# Patient Record
Sex: MALE | Race: WHITE | NOT HISPANIC OR LATINO
[De-identification: names, ages, dates, MRNs, and addresses within clinical notes are randomized per-mention and may not be internally consistent; named-entity substitution may affect disease eponyms.]

---

## 2017-01-18 ENCOUNTER — TRANSCRIPTION ENCOUNTER (OUTPATIENT)
Age: 27
End: 2017-01-18

## 2017-04-13 ENCOUNTER — EMERGENCY (EMERGENCY)
Facility: HOSPITAL | Age: 27
LOS: 1 days | Discharge: HOME | End: 2017-04-13
Attending: EMERGENCY MEDICINE | Admitting: EMERGENCY MEDICINE
Payer: COMMERCIAL

## 2017-04-13 VITALS
OXYGEN SATURATION: 96 % | RESPIRATION RATE: 18 BRPM | SYSTOLIC BLOOD PRESSURE: 160 MMHG | HEIGHT: 74 IN | DIASTOLIC BLOOD PRESSURE: 94 MMHG | TEMPERATURE: 96 F | HEART RATE: 135 BPM | WEIGHT: 181 LBS

## 2017-04-13 VITALS
HEART RATE: 88 BPM | DIASTOLIC BLOOD PRESSURE: 76 MMHG | OXYGEN SATURATION: 97 % | TEMPERATURE: 98 F | SYSTOLIC BLOOD PRESSURE: 142 MMHG | RESPIRATION RATE: 16 BRPM

## 2017-04-13 DIAGNOSIS — F12.920 CANNABIS USE, UNSPECIFIED WITH INTOXICATION, UNCOMPLICATED: ICD-10-CM

## 2017-04-13 DIAGNOSIS — T40.601D: ICD-10-CM

## 2017-04-13 DIAGNOSIS — F19.20 OTHER PSYCHOACTIVE SUBSTANCE DEPENDENCE, UNCOMPLICATED: ICD-10-CM

## 2017-04-13 DIAGNOSIS — T40.1X1A POISONING BY HEROIN, ACCIDENTAL (UNINTENTIONAL), INITIAL ENCOUNTER: ICD-10-CM
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
## 2017-04-13 DIAGNOSIS — R69 ILLNESS, UNSPECIFIED: ICD-10-CM

## 2017-04-13 LAB
AMPHET UR-MCNC: NEGATIVE — SIGNIFICANT CHANGE UP
ANION GAP SERPL CALC-SCNC: 11 MMOL/L — SIGNIFICANT CHANGE UP (ref 5–17)
APAP SERPL-MCNC: <2 UG/ML — LOW (ref 10–30)
BARBITURATES UR SCN-MCNC: NEGATIVE — SIGNIFICANT CHANGE UP
BASOPHILS # BLD AUTO: 0.1 K/UL — SIGNIFICANT CHANGE UP (ref 0–0.2)
BASOPHILS NFR BLD AUTO: 0.6 % — SIGNIFICANT CHANGE UP (ref 0–2)
BENZODIAZ UR-MCNC: POSITIVE — SIGNIFICANT CHANGE UP
BUN SERPL-MCNC: 17 MG/DL — SIGNIFICANT CHANGE UP (ref 7–23)
CALCIUM SERPL-MCNC: 9.4 MG/DL — SIGNIFICANT CHANGE UP (ref 8.5–10.1)
CHLORIDE SERPL-SCNC: 104 MMOL/L — SIGNIFICANT CHANGE UP (ref 96–108)
CO2 SERPL-SCNC: 25 MMOL/L — SIGNIFICANT CHANGE UP (ref 22–31)
COCAINE METAB.OTHER UR-MCNC: NEGATIVE — SIGNIFICANT CHANGE UP
CREAT SERPL-MCNC: 1.8 MG/DL — HIGH (ref 0.5–1.3)
EOSINOPHIL # BLD AUTO: 0.1 K/UL — SIGNIFICANT CHANGE UP (ref 0–0.5)
EOSINOPHIL NFR BLD AUTO: 0.7 % — SIGNIFICANT CHANGE UP (ref 0–6)
ETHANOL SERPL-MCNC: <10 MG/DL — SIGNIFICANT CHANGE UP (ref 0–10)
GLUCOSE SERPL-MCNC: 382 MG/DL — HIGH (ref 70–99)
HCT VFR BLD CALC: 55 % — HIGH (ref 39–50)
HGB BLD-MCNC: 17.5 G/DL — HIGH (ref 13–17)
LYMPHOCYTES # BLD AUTO: 13.1 % — SIGNIFICANT CHANGE UP (ref 13–44)
LYMPHOCYTES # BLD AUTO: 2.3 K/UL — SIGNIFICANT CHANGE UP (ref 1–3.3)
MCHC RBC-ENTMCNC: 31.4 PG — SIGNIFICANT CHANGE UP (ref 27–34)
MCHC RBC-ENTMCNC: 31.8 GM/DL — LOW (ref 32–36)
MCV RBC AUTO: 98.7 FL — SIGNIFICANT CHANGE UP (ref 80–100)
METHADONE UR-MCNC: NEGATIVE — SIGNIFICANT CHANGE UP
MONOCYTES # BLD AUTO: 0.4 K/UL — SIGNIFICANT CHANGE UP (ref 0–0.9)
MONOCYTES NFR BLD AUTO: 2.6 % — SIGNIFICANT CHANGE UP (ref 1–9)
NEUTROPHILS # BLD AUTO: 14.6 K/UL — HIGH (ref 1.8–7.4)
NEUTROPHILS NFR BLD AUTO: 83 % — HIGH (ref 43–77)
OPIATES UR-MCNC: POSITIVE — SIGNIFICANT CHANGE UP
PCP SPEC-MCNC: SIGNIFICANT CHANGE UP
PCP UR-MCNC: NEGATIVE — SIGNIFICANT CHANGE UP
PLATELET # BLD AUTO: 325 K/UL — SIGNIFICANT CHANGE UP (ref 150–400)
POTASSIUM SERPL-MCNC: 5.7 MMOL/L — HIGH (ref 3.5–5.3)
POTASSIUM SERPL-SCNC: 5.7 MMOL/L — HIGH (ref 3.5–5.3)
RBC # BLD: 5.57 M/UL — SIGNIFICANT CHANGE UP (ref 4.2–5.8)
RBC # FLD: 13.3 % — SIGNIFICANT CHANGE UP (ref 10.3–14.5)
SALICYLATES SERPL-MCNC: <1.7 MG/DL — LOW (ref 2.8–20)
SODIUM SERPL-SCNC: 140 MMOL/L — SIGNIFICANT CHANGE UP (ref 135–145)
THC UR QL: POSITIVE — SIGNIFICANT CHANGE UP
WBC # BLD: 17.6 K/UL — HIGH (ref 3.8–10.5)
WBC # FLD AUTO: 17.6 K/UL — HIGH (ref 3.8–10.5)

## 2017-04-13 PROCEDURE — 96360 HYDRATION IV INFUSION INIT: CPT | Mod: 59

## 2017-04-13 PROCEDURE — 99284 EMERGENCY DEPT VISIT MOD MDM: CPT | Mod: 25

## 2017-04-13 PROCEDURE — 92950 HEART/LUNG RESUSCITATION CPR: CPT

## 2017-04-13 PROCEDURE — 80048 BASIC METABOLIC PNL TOTAL CA: CPT

## 2017-04-13 PROCEDURE — 99285 EMERGENCY DEPT VISIT HI MDM: CPT | Mod: 25

## 2017-04-13 PROCEDURE — 80307 DRUG TEST PRSMV CHEM ANLYZR: CPT

## 2017-04-13 PROCEDURE — 85027 COMPLETE CBC AUTOMATED: CPT

## 2017-04-13 RX ORDER — ALPRAZOLAM 0.25 MG
2 TABLET ORAL ONCE
Qty: 0 | Refills: 0 | Status: DISCONTINUED | OUTPATIENT
Start: 2017-04-13 | End: 2017-04-13

## 2017-04-13 RX ORDER — SODIUM CHLORIDE 9 MG/ML
1000 INJECTION INTRAMUSCULAR; INTRAVENOUS; SUBCUTANEOUS ONCE
Qty: 0 | Refills: 0 | Status: COMPLETED | OUTPATIENT
Start: 2017-04-13 | End: 2017-04-13

## 2017-04-13 RX ADMIN — Medication 2 MILLIGRAM(S): at 10:45

## 2017-04-13 RX ADMIN — SODIUM CHLORIDE 2000 MILLILITER(S): 9 INJECTION INTRAMUSCULAR; INTRAVENOUS; SUBCUTANEOUS at 06:45

## 2017-04-13 NOTE — ED ADULT NURSE NOTE - OBJECTIVE STATEMENT
as per EMS.. pt found on floor by mother. ems arrived began ACLS for cardiac arrest. narcan 3 mg intra nasal given and cpr done x 10 minutes. pt awoke, in sinus tach.. pt confused, asking repetitive questions. admits to snorting heroin and also uses xanax.

## 2017-04-13 NOTE — ED BEHAVIORAL HEALTH ASSESSMENT NOTE - PRIMARY DX
Opiate or related narcotic overdose, accidental or unintentional, subsequent encounter Axis II diagnosis deferred

## 2017-04-13 NOTE — ED BEHAVIORAL HEALTH ASSESSMENT NOTE - DETAILS
Remote seizure after running out of Xanax prescription Maternal bipolar Patient was initially altered s/p opiate overdose however was alert and oriented X 4 during psychiatric evaluation Patient had initial compromised respiratory function s/p opiate overdose however improved with treatment in ED Mother notified.

## 2017-04-13 NOTE — ED PROVIDER NOTE - OBJECTIVE STATEMENT
28yo male bib ems s/p drug overdose. pt was found unresponsive by mom, ems was called, pt was found to be pulseless and cpr was initiated, pt given narcan 3mg intranasal and after 10 minutes pt woke up, upon arrival to ER pt is denying any drug use at this time, and is still lethargic from overdosing so no further hx is able to be obtained

## 2017-04-13 NOTE — ED BEHAVIORAL HEALTH ASSESSMENT NOTE - HPI (INCLUDE ILLNESS QUALITY, SEVERITY, DURATION, TIMING, CONTEXT, MODIFYING FACTORS, ASSOCIATED SIGNS AND SYMPTOMS)
Mr. Mcclelland is a 27-year-old single  male, domiciled with family in [place], non-caregiver, with PPDx Anxiety, no prior hospitalizations, with chronic substance use (opiates) and substance-related treatment (Suboxone program 5 years ago), with prior withdrawals and 2 seizures secondary to running out of Xanax prescription, with no prior DT’s / complications, with no prior trauma/abuse, with family history of maternal bipolar, with no relevant PMHx, was referred to [facility] by [place] Attending secondary to mother reporting patient is suicidal in the context of heroin overdose.    Patient is reported to have been found unresponsive by mother, and when EMS arrived, patient was pulseless and CPR was initiated, with Narcan 3 mg being administered intranasally after patient awakened after 10 minutes.     Upon arrival to ED, patient denied substance use at the time, however urine toxicology results reveals opiates, THC, benzodiazepines. Patient was later forthcoming about his substance use during evaluation, stating to have relapsed today after 2 years of being sober, adding to have used 2 bags of heroin and stating the overdose was accidental / unintentional. Reports remorse / regret, stating "it was stupid." Reports last overdose was the same scenario, using after being sober for a long time, and ended up in hospital. Denies having suicidal ideation before -reusing heroin, stating "I was bored and had a little cash on me." Denies prior / current suicidal ideation/intent/plan. Denies prior passive suicidality. When asked about his mother's concern about his suicidality, patient reports mother being an "unreliable source of information; she has bipolar and does not like me." Denies prior / current depressive symptoms of persistent sad mood, hopelessness, helplessness, worthlessness, anhedonia, guilt feelings, difficulty concentrating, fatigue, decreased appetite, low motivation and difficulty falling asleep. Reports to be socially isolative because his friends are substance abusers. Reports anxiety being stable and well managed by outpatient psychiatrist (for 6 years), with whom he has appointment with. Denies manic / psychotic symptoms. Denies acute changes in mood / function, adding to go to work and care for self. Denies stressors. Reports enjoying keeping to self and watching TV. Reports having strained relationship with family as they favor his sister and want to kick him out the house, however stating to have positive therapeutic relationships with them, and social supports at work. Reports future orientation, with motivation to continue outpatient treatment. Reports wanting to go home, "shower and smoke a cigarette."     Collateral obtained by [facility], please see  note for full collateral note, however in short: mother reports chronic history of substance use, and prior overdoses, of which last one was 2 years ago, accidental and unintentional Hence. During relapse. Reports patient recently relapsed. Reports patient has worsening depression / anxiety, and is isolative. Reports patient has expressed suicidal ideation when angry (patient denies), denying recent or prior suicide attempt / self-injurious behaviors.  Denies prior hospitalization. Reports being treated by out-patient psychiatrist.

## 2017-04-13 NOTE — ED BEHAVIORAL HEALTH ASSESSMENT NOTE - CASE SUMMARY
27-year-old single  male, domiciled with family in [place], non-caregiver, with PPDx Anxiety, no prior hospitalizations, with chronic substance use (opiates) and substance-related treatment (Suboxone program 5 years ago), with prior withdrawals and 2 seizures secondary to running out of Xanax prescription, with no prior DT’s / complications, with no prior trauma/abuse, with family history of maternal bipolar, with no relevant PMHx, was referred to [facility] by [place] Attending secondary to mother reporting patient is suicidal in the context of heroin overdose.   on evaluation pt is not suicidal or an acute danger. he does not require inpatient admission. the primary diagnosis is opioid dependence (relapse). he will be offered chem dependency tx and can f/u with his outpatient provider.

## 2017-04-13 NOTE — ED BEHAVIORAL HEALTH NOTE - BEHAVIORAL HEALTH NOTE
Telepsychiatry Encounter  I have visualized that the patient is on an arms-length 1:1.  I have visualized that the patient is in a private space.  I have confirmed with the patient that they understanding and agree to the evaluation being performed via Telepsychiatry.  I have discussed the above with Telepsychiatry Attending :  Jeanne uMnguia NP  Records reviewed: [place], Tier, CVM, [facility], Psyckes, Alpha:    No results found    Collateral contact name/phone:    Cherie Mcclelland , Mother, [phone]  Main psych symptoms/what led to ED visit/ presentation in ED:   Per ED assessment Pt was brought to ED after mother found Pt unresponsive as a result of an OD.  Per ED staff Pt is calm but “manipulative” with staff.  Per mother, daughter found Pt laying on his bed unconscious and 911 was called. Mother reports Pt was not breathing and was revived by EMS with Narcan. Mother reports sister found Pt unconscious on his bed and called EMS. Mother reports Pt did not verbalize SI but mother feels “he clearly doesn’t care whether he lives or dies.”    Main Psych diagnosis/ history pt of hospitalizations/recent admission/ HIP:   Mother reports she is unsure of Pt’s psychiatric diagnosis but believes he “must have one” as pt is seen by outpatient, Dr. Radames Baldwin. Mother reports Pt has no hx of psychiatric hospitalizations. Mother reports Pt has hx of being DX ADHD as a child and struggled with a learning disability throughout school. Per mother she noticed change in Pt’s behavior “a few weeks ago.” Mother reports Pt began to appear increasingly anxious and depression. Mother reports Pt has been isolating to room and “has no friends” because they are all “dead from an overdose or they don’t walk to him anymore.”  Mother reports pt has been attending work regularly during this time. Mother reports she suspects pt relapsed on heroin at some point over the last several weeks. However, Mother was unable to confirm when Pt relapsed on heroin.   When was patient at their baseline? Is the patient a reliable source of information?  Mother reports Pt has ongoing struggles with anxiety. Mother reports that pt was last at baseline “a few weeks ago” prior to when mother suspects Pt relapsed on heroin.   Medical history:   Mother reports pt has no known significant medical hx.   Family History of illness:   Mother denies family hx of mental illness  Pt lives with? Caregiver status & location:  Pt currently resides with mother. Mother reports Pt cannot return to home as he has caused the family “to have a breakdown” and mother is uncomfortable with his return. ED SW/ RN was made aware mother does not wish to have Pt return to her home.   Dependents/kids/CPS?  Mother reports Pt has no dependents.   Is patient employed or receives benefits, single/ ?  Per mother pt is employed as a . Mother reports she feels pt’s job causes him increased anxiety.   Changes in sleep:  Mother reports pt often has difficulty sleeping.   Appetite:   Per mother Pt has had poor appetite for “a while” which mother attributes to relapse on heroin. Mother reports pt has lost weight in the last several weeks.   Mood:  Per mother pt has been increasingly anxious and depressed. Mother reports “I can see it” when asked how she knows pt has been experiencing increased depression and anxiety.  Mother reports Pt has not verbalized increased depression or SI to her. Mother reports Pt does often endorse anxiety.   Hallucinations/Delusions:    Per mother no known hx of AH/VH/Delusions   Current stressors:   Per mother she is unaware of any new stressors in pt’s life. Per mother pt’s job is an ongoing stressor.   Previous inpatient history: Most recent?  Mother reports pt has no hx of inpatient psychiatric admissions   Past episodes similar? Different?  Per mother pt has hx of multiple OD on heroin. Mother reports pt’s last overdose was x2 years ago. Mother reports, she does not believe these were suicide attempts. Mother reports Pt has ongoing anxiety.   Current treatment? Meds? Community MD?  Pt currently sees Dr. Radames Baldwin, outpatient psychiatrist and per mother and ED RN is prescribed, Xanax 1mg, 2tabs, TID, Klonodine .1mg TID and Lexapro 10mg daily. Mother reports pt was started on Lexapro x3 weeks ago due to worsening of anxiety. Writer left message for Dr. Baldwin at [phone] to obtain collateral information.   Prior suicidality? Any attempts? Do they have access to weapons?  Per mother, Pt has no hx of suicidality/suicide attempts. Mother reports pt has no known access to weapons.  Substance Use:  Per mother pt has extensive hx of heroin and Marijuana use. Mother reports Pt had stopped use of heroin x1 year with the use of Suboxone. Mother reports pt stopped Suboxone x1 year ago. Mother reports pt recently relapsed on heroin. Mother is unsure of exactly when Pt relapsed but suspects relapse occurred “a few weeks ago” as she noticed changes in behavior and weight loss that typically coincides with pt’s heroin use. Per mother pt has hx of numerous OD. Mother reports on one occasion pt OD 2x in the same day and was revived by Narcan on both occasions. Mother reports Pt has no hx of rehab/detox or outpatient substance abuse tx. Mother reports pt has refused all of the aforementioned services.   Prior Violence/aggression:  Per mother pt has no hx of physical violence/aggression but is often verbally aggressive toward her and family.   Prior Legal hx:   Per mother pt has current pending legal charges and no hx of legal issues.   Access to weapons:  Per mother pt has no known access to weapons.   Physical/Sexual /emotional abuse or neglect or hx of trauma:  Per mother pt has no hx of trauma/abuse   Aftercare role: Can you assist the pt if he/she is discharged today? Can the patient return home? Can you assist with transportation home?   Mother reports pt cannot assist pt if he is d/c today. Mother reports Pt is not welcome to return to families home.   Resources provided: Writer faxed resources for Substance abuse tx to [place] ED. Writer called, ED staff confirmed they were received.   I have discussed the above with Telepsychiatry Attending:  Jeanne Munguia NP

## 2017-04-13 NOTE — ED BEHAVIORAL HEALTH ASSESSMENT NOTE - DESCRIPTION
Patient was calm and cooperative in the ED and did not exhibit any aggression. Patient did get anxious and was given Xanax 2 mg once (prescribed dose). No physical restraints. None. Please see HPI/BH note.

## 2017-04-13 NOTE — ED BEHAVIORAL HEALTH ASSESSMENT NOTE - SUMMARY
Mr. Mcclelland is a 27-year-old single  male, domiciled with family in [place], non-caregiver, with PPDx Anxiety, no prior hospitalizations, with chronic substance use (opiates) and substance-related treatment (Suboxone program 5 years ago), with prior withdrawals and 2 seizures secondary to running out of Xanax prescription, with no prior DT’s / complications, with no prior trauma/abuse, with family history of maternal bipolar, with no relevant PMHx, was referred to [facility]Psychiat[facility] by [place] Attending secondary to mother reporting patient is suicidal in the context of heroin overdose.    Patient's overdose was accidental and unintentional as per self-report from patient in the context of relapse after being sober for 2 years. Patient did not think / feel / express suicidality before or during heroin use. Patient accidentally overdosed in the same context 2 years ago, which is congruent with mother's report. Mother report patient making suicidal statements when angry however patient denies. Patient denies depressive symptoms including hopelessness / anhedonia. Patient denies manic/anxiety/psychotic symptoms. Patient denies acute changes in mood / function. Patient has no prior self-injurious behaviors or suicide attempt or in-patient hospitalization. Patient has been in treatment with out-patient psychiatrist for 6 years and reports anxiety being well managed with current psychotropic regimen. Patient is remorseful over incident. Patient agreeable to out-patient substance abuse treatment. Patient has appointment with psychiatrist next week. Patient engaged in safety planning. Patient presentation indicative of substance use and dependence. Patient is not presenting as an imminent risk for harm to self, and does not meet criteria for involuntary in-patient hospitalization.

## 2017-04-13 NOTE — ED ADULT NURSE REASSESSMENT NOTE - NS ED NURSE REASSESS COMMENT FT1
pt signed out AMA.  pt informed about all potential complications including death. pt signed out anyway

## 2017-04-13 NOTE — ED BEHAVIORAL HEALTH ASSESSMENT NOTE - DESCRIPTION (FIRST USE, LAST USE, QUANTITY, FREQUENCY, DURATION)
Daily Social use Infrequent use Relapsed after two years. First use in high school. See HPI for additional information Patient is prescribed this, however is not abusing it. See HPI for additional information

## 2017-04-13 NOTE — ED BEHAVIORAL HEALTH ASSESSMENT NOTE - RISK ASSESSMENT
Patient presents as a low - moderate risk for harm to self, with risk factors including recent opiate overdose that was accidental in nature, strained family relationships, other substance use, history of anxiety diagnosis, prior accidental overdose, of which are outweighed by significant protective factors, including no previous suicide attempts, no history of violence, no access to firearms, no global insomnia, positive therapeutic relationships, supportive family and social supports, willingness to seek help, no suicidal/homicidal ideations intent or plans, hopefulness for future, ability to cope with stress,  frustration tolerance, engaging in discharge and safety planning, motivation to participate in outpatient treatment.

## 2017-04-13 NOTE — ED ADULT TRIAGE NOTE - CHIEF COMPLAINT QUOTE
patient found by mother unresponsive on the floor, called 911, patient was asystole on ems arrival CPR performed for approx 10 minutes, patient received 3mg INj narcan intranasally by ems

## 2017-04-13 NOTE — ED BEHAVIORAL HEALTH ASSESSMENT NOTE - REFERRAL / APPOINTMENT DETAILS
Patient to follow-up with outpatient psychiatrist as per scheduled appointment next week. Patient to explore using substance abuse treatment resources provided at discharge.

## 2017-04-13 NOTE — ED BEHAVIORAL HEALTH ASSESSMENT NOTE - DIFFERENTIAL
Opiate or related narcotic overdose, accidental or unintentional, subsequent encounter versus substance induced mood disorder; marijuana intoxication, uncomplicated; substance dependence; generalized anxiety disorder

## 2017-04-30 ENCOUNTER — EMERGENCY (EMERGENCY)
Facility: HOSPITAL | Age: 27
LOS: 1 days | Discharge: HOME | End: 2017-04-30
Attending: EMERGENCY MEDICINE | Admitting: EMERGENCY MEDICINE
Payer: COMMERCIAL

## 2017-04-30 VITALS
HEIGHT: 72 IN | SYSTOLIC BLOOD PRESSURE: 104 MMHG | HEART RATE: 142 BPM | TEMPERATURE: 99 F | OXYGEN SATURATION: 999 % | DIASTOLIC BLOOD PRESSURE: 75 MMHG | WEIGHT: 179.9 LBS | RESPIRATION RATE: 18 BRPM

## 2017-04-30 VITALS
SYSTOLIC BLOOD PRESSURE: 115 MMHG | OXYGEN SATURATION: 99 % | RESPIRATION RATE: 16 BRPM | TEMPERATURE: 98 F | DIASTOLIC BLOOD PRESSURE: 57 MMHG | HEART RATE: 104 BPM

## 2017-04-30 DIAGNOSIS — T40.1X1A POISONING BY HEROIN, ACCIDENTAL (UNINTENTIONAL), INITIAL ENCOUNTER: ICD-10-CM
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
## 2017-04-30 LAB
ALBUMIN SERPL ELPH-MCNC: 3.8 G/DL — SIGNIFICANT CHANGE UP (ref 3.3–5)
ALP SERPL-CCNC: 65 U/L — SIGNIFICANT CHANGE UP (ref 40–120)
ALT FLD-CCNC: 36 U/L — SIGNIFICANT CHANGE UP (ref 12–78)
ANION GAP SERPL CALC-SCNC: 7 MMOL/L — SIGNIFICANT CHANGE UP (ref 5–17)
AST SERPL-CCNC: 22 U/L — SIGNIFICANT CHANGE UP (ref 15–37)
BASOPHILS # BLD AUTO: 0 K/UL — SIGNIFICANT CHANGE UP (ref 0–0.2)
BASOPHILS NFR BLD AUTO: 0.3 % — SIGNIFICANT CHANGE UP (ref 0–2)
BILIRUB SERPL-MCNC: 0.2 MG/DL — SIGNIFICANT CHANGE UP (ref 0.2–1.2)
BUN SERPL-MCNC: 12 MG/DL — SIGNIFICANT CHANGE UP (ref 7–23)
CALCIUM SERPL-MCNC: 8.1 MG/DL — LOW (ref 8.5–10.1)
CHLORIDE SERPL-SCNC: 109 MMOL/L — HIGH (ref 96–108)
CO2 SERPL-SCNC: 28 MMOL/L — SIGNIFICANT CHANGE UP (ref 22–31)
CREAT SERPL-MCNC: 1.6 MG/DL — HIGH (ref 0.5–1.3)
EOSINOPHIL # BLD AUTO: 0 K/UL — SIGNIFICANT CHANGE UP (ref 0–0.5)
EOSINOPHIL NFR BLD AUTO: 0.1 % — SIGNIFICANT CHANGE UP (ref 0–6)
GLUCOSE SERPL-MCNC: 65 MG/DL — LOW (ref 70–99)
HCT VFR BLD CALC: 43.2 % — SIGNIFICANT CHANGE UP (ref 39–50)
HGB BLD-MCNC: 14.3 G/DL — SIGNIFICANT CHANGE UP (ref 13–17)
LYMPHOCYTES # BLD AUTO: 0.9 K/UL — LOW (ref 1–3.3)
LYMPHOCYTES # BLD AUTO: 5.5 % — LOW (ref 13–44)
MCHC RBC-ENTMCNC: 31.6 PG — SIGNIFICANT CHANGE UP (ref 27–34)
MCHC RBC-ENTMCNC: 33.2 GM/DL — SIGNIFICANT CHANGE UP (ref 32–36)
MCV RBC AUTO: 95.4 FL — SIGNIFICANT CHANGE UP (ref 80–100)
MONOCYTES # BLD AUTO: 1.3 K/UL — HIGH (ref 0–0.9)
MONOCYTES NFR BLD AUTO: 8.3 % — SIGNIFICANT CHANGE UP (ref 1–9)
NEUTROPHILS # BLD AUTO: 13.5 K/UL — HIGH (ref 1.8–7.4)
NEUTROPHILS NFR BLD AUTO: 85.8 % — HIGH (ref 43–77)
PLATELET # BLD AUTO: 190 K/UL — SIGNIFICANT CHANGE UP (ref 150–400)
POTASSIUM SERPL-MCNC: 4.2 MMOL/L — SIGNIFICANT CHANGE UP (ref 3.5–5.3)
POTASSIUM SERPL-SCNC: 4.2 MMOL/L — SIGNIFICANT CHANGE UP (ref 3.5–5.3)
PROT SERPL-MCNC: 6.1 G/DL — SIGNIFICANT CHANGE UP (ref 6–8.3)
RBC # BLD: 4.53 M/UL — SIGNIFICANT CHANGE UP (ref 4.2–5.8)
RBC # FLD: 12.3 % — SIGNIFICANT CHANGE UP (ref 10.3–14.5)
SODIUM SERPL-SCNC: 144 MMOL/L — SIGNIFICANT CHANGE UP (ref 135–145)
WBC # BLD: 15.8 K/UL — HIGH (ref 3.8–10.5)
WBC # FLD AUTO: 15.8 K/UL — HIGH (ref 3.8–10.5)

## 2017-04-30 PROCEDURE — 99284 EMERGENCY DEPT VISIT MOD MDM: CPT | Mod: 25

## 2017-04-30 PROCEDURE — 80053 COMPREHEN METABOLIC PANEL: CPT

## 2017-04-30 PROCEDURE — 85027 COMPLETE CBC AUTOMATED: CPT

## 2017-04-30 PROCEDURE — 96360 HYDRATION IV INFUSION INIT: CPT

## 2017-04-30 PROCEDURE — 93005 ELECTROCARDIOGRAM TRACING: CPT

## 2017-04-30 RX ORDER — SODIUM CHLORIDE 9 MG/ML
1000 INJECTION INTRAMUSCULAR; INTRAVENOUS; SUBCUTANEOUS ONCE
Qty: 0 | Refills: 0 | Status: COMPLETED | OUTPATIENT
Start: 2017-04-30 | End: 2017-04-30

## 2017-04-30 RX ADMIN — SODIUM CHLORIDE 1000 MILLILITER(S): 9 INJECTION INTRAMUSCULAR; INTRAVENOUS; SUBCUTANEOUS at 01:30

## 2017-04-30 NOTE — ED ADULT NURSE NOTE - OBJECTIVE STATEMENT
Patient came in to ED brought in by EMS s/p heroin overdose. Patient was unresponsive and improved after given narcan by the EMS. Patient is awake, oriented x3. Noted with g20 on his left forearm patent and intact.

## 2017-04-30 NOTE — ED PROVIDER NOTE - OBJECTIVE STATEMENT
pt bib ems s/p heroin overdose at home. per ems, pt was unrepsonsive, improved after narcan given. pt admits to heroin use tonight. denies si, depression, ha, d/n/v, cp, sob, abd pain, or any other complaints.

## 2017-04-30 NOTE — ED ADULT NURSE REASSESSMENT NOTE - NS ED NURSE REASSESS COMMENT FT1
Patient went home AMA, AMA form signed by patient. Dr. Kearney is aware, consequences explained. IV cannula removed. Patient is awake, oriented x4.

## 2017-04-30 NOTE — ED PROVIDER NOTE - PROGRESS NOTE DETAILS
The patient was offered social consult for admission into detox rehab. He is refusing this offer and wants to go home

## 2017-06-20 ENCOUNTER — TRANSCRIPTION ENCOUNTER (OUTPATIENT)
Age: 27
End: 2017-06-20

## 2017-06-20 ENCOUNTER — EMERGENCY (EMERGENCY)
Facility: HOSPITAL | Age: 27
LOS: 1 days | Discharge: HOME | End: 2017-06-20
Attending: INTERNAL MEDICINE | Admitting: EMERGENCY MEDICINE
Payer: COMMERCIAL

## 2017-06-20 VITALS
RESPIRATION RATE: 16 BRPM | SYSTOLIC BLOOD PRESSURE: 136 MMHG | HEART RATE: 112 BPM | TEMPERATURE: 99 F | DIASTOLIC BLOOD PRESSURE: 78 MMHG | HEIGHT: 74 IN | WEIGHT: 184.97 LBS | OXYGEN SATURATION: 98 %

## 2017-06-20 VITALS
RESPIRATION RATE: 16 BRPM | OXYGEN SATURATION: 98 % | TEMPERATURE: 98 F | DIASTOLIC BLOOD PRESSURE: 71 MMHG | HEART RATE: 92 BPM | SYSTOLIC BLOOD PRESSURE: 112 MMHG

## 2017-06-20 LAB
APPEARANCE UR: ABNORMAL
BILIRUB UR-MCNC: NEGATIVE — SIGNIFICANT CHANGE UP
COLOR SPEC: YELLOW — SIGNIFICANT CHANGE UP
DIFF PNL FLD: ABNORMAL
GLUCOSE UR QL: NEGATIVE MG/DL — SIGNIFICANT CHANGE UP
KETONES UR-MCNC: NEGATIVE — SIGNIFICANT CHANGE UP
LEUKOCYTE ESTERASE UR-ACNC: ABNORMAL
NITRITE UR-MCNC: NEGATIVE — SIGNIFICANT CHANGE UP
PH UR: 6.5 — SIGNIFICANT CHANGE UP (ref 5–8)
PROT UR-MCNC: 30 MG/DL
SP GR SPEC: 1.01 — SIGNIFICANT CHANGE UP (ref 1.01–1.02)
UROBILINOGEN FLD QL: NEGATIVE MG/DL — SIGNIFICANT CHANGE UP

## 2017-06-20 PROCEDURE — 99284 EMERGENCY DEPT VISIT MOD MDM: CPT | Mod: 25

## 2017-06-20 PROCEDURE — 81001 URINALYSIS AUTO W/SCOPE: CPT

## 2017-06-20 PROCEDURE — 74176 CT ABD & PELVIS W/O CONTRAST: CPT | Mod: 26

## 2017-06-20 PROCEDURE — 99284 EMERGENCY DEPT VISIT MOD MDM: CPT

## 2017-06-20 PROCEDURE — 74176 CT ABD & PELVIS W/O CONTRAST: CPT

## 2017-06-20 RX ORDER — ALPRAZOLAM 0.25 MG
1 TABLET ORAL
Qty: 0 | Refills: 0 | COMMUNITY

## 2017-06-20 RX ORDER — TAMSULOSIN HYDROCHLORIDE 0.4 MG/1
1 CAPSULE ORAL
Qty: 5 | Refills: 0 | OUTPATIENT
Start: 2017-06-20 | End: 2017-06-25

## 2017-06-20 NOTE — ED PROVIDER NOTE - GENITOURINARY, MLM
normal testicle and positive cremaster reflex, normal groin. non-tender epididymus, testes and scrotum. no right inguinal lymph nodes.

## 2017-06-20 NOTE — ED ADULT NURSE NOTE - CHPI ED SYMPTOMS NEG
no dysuria/no diarrhea/no fever/no nausea/no vomiting/no chills/no abdominal distension/no burning urination/no blood in stool

## 2017-06-20 NOTE — ED PROVIDER NOTE - CHPI ED SYMPTOMS NEG
no diarrhea/no nausea/no vomiting/no fever no vomiting/no diarrhea/no nausea/no burning urination/no chills/no dysuria/no fever

## 2017-06-20 NOTE — ED PROVIDER NOTE - OBJECTIVE STATEMENT
26 y/o M pt presents to the ED c/o perineal and right groin pain and possible testicular pain. Pt was in a lot of pain earlier and went to Urgent Care and they told him he had hematuria and sent him to the ED. Denies fever, nausea, vomiting, diarrhea. No further complaints at this time. 28 y/o M pt presents to the ED c/o severe perineal and right groin pain and possible testicular pain earlier. went to Urgent Care and they told him he had hematuria and sent him to the ED. Denies fever, nausea, vomiting, diarrhea. No further complaints at this time.in no pain presently.

## 2017-06-20 NOTE — ED PROVIDER NOTE - CONSTITUTIONAL, MLM
normal... Well appearing, appears comfortable, well nourished, awake, alert, oriented to person, place, time/situation and in no apparent distress.

## 2017-06-20 NOTE — ED PROVIDER NOTE - PHYSICAL EXAMINATION
normal testicle and positive cremaster reflex, normal groin. non-tender epididymus, testes and scrotum. no right inguinal lymph nodes. normal descended testicle,nt, positive cremaster reflex, non-tender epididymis, testes and scrotum. no right inguinal lymph nodes.

## 2017-06-20 NOTE — ED ADULT TRIAGE NOTE - CHIEF COMPLAINT QUOTE
" I was sent from [facility] Urgent care because of testicular (Right) pain "

## 2017-06-20 NOTE — ED ADULT NURSE NOTE - OBJECTIVE STATEMENT
Pt with complaint of severe pain in both testicles. Went to Urgent Care where blood was noted in urine and referred to ED. Abdomen soft non tender No tenderness swelling or external trauma to testicles Pt with complaint of severe pain in both testicles. Went to Urgent Care where blood was noted in urine and referred to ED. Abdomen soft non tender No tenderness swelling or external trauma to testicles Blood tinged urine noted

## 2018-11-28 NOTE — ED ADULT TRIAGE NOTE - HEIGHT IN FEET
Problem: Patient Care Overview  Goal: Plan of Care Review  Outcome: Ongoing (interventions implemented as appropriate)  Pt on 1.5L NC. Sats 95%. No distress noted. IS done. Will continue to monitor.       6

## 2018-12-19 NOTE — ED PROVIDER NOTE - CHPI ED SYMPTOMS NEG
DVT (deep venous thrombosis)    Gout    HTN (hypertension)    Other specified diabetes mellitus with other specified complication, unspecified whether long term insulin use    Rheumatoid arteritis
no paranoia/no homicidal/no hallucinations

## 2024-08-30 NOTE — ED BEHAVIORAL HEALTH ASSESSMENT NOTE - SUICIDE PROTECTIVE FACTORS
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

## 2025-03-07 NOTE — ED PROVIDER NOTE - CROS ED ROS STATEMENT
[facility] [place] [facility]  at [facility]  [address], [address]  [place] [address] [phone]    Hematology Consultation Note        Patient: Buck Perdomo MRN: [id number]  SSN: [id number]    YOB: 1950  Age: 74 y.o.  Sex: male      Subjective:      Buck Perdomo is a 74 y.o. male who I am seeing for a new diagnosis of acute PE. He came in the hospital with dyspnea. CTA showed acute PE. He is taking Eliquis and is doing well.         Review of Systems:  Constitutional: negative  Eyes: negative  Ears, Nose, Mouth, Throat, and Face: negative  Respiratory: negative  Cardiovascular: negative  Gastrointestinal: negative  Genitourinary:negative  Integument/Breast: negative  Hematologic/Lymphatic: negative  Musculoskeletal:negative  Neurological: negative        No past medical history on file.  No past surgical history on file.   No family history on file.  Social History     Tobacco Use    Smoking status: Never    Smokeless tobacco: Never   Substance Use Topics    Alcohol use: Not Currently      Prior to Admission medications    Medication Sig Start Date End Date Taking? Authorizing Provider   apixaban (ELIQUIS) 5 MG TABS tablet Take 1 tablet by mouth 2 times daily 2/25/25 3/27/25 Yes Shu Barnes APRN - CNP   lisinopril (PRINIVIL;ZESTRIL) 2.5 MG tablet Take by mouth daily   Yes ProviderHoward MD   simvastatin (ZOCOR) 5 MG tablet Take by mouth nightly   Yes ProviderHoward MD   aspirin 81 MG chewable tablet Take 1 tablet by mouth daily   Yes Howard Rahman MD   apixaban (ELIQUIS) 5 MG TABS tablet Take 2 tablets by mouth 2 times daily for 7 days 2/17/25 2/24/25  Shu Barnes APRN - CNP              No Known Allergies        Objective:     Vitals:    03/07/25 1516   BP: (!) 152/66   Site: Left Upper Arm   Position: Sitting   Pulse: 88   Temp: 98.2 °F (36.8 °C)   TempSrc: Temporal   SpO2: 97%   Weight: 89.4 kg (197  all other ROS negative except as per HPI